# Patient Record
Sex: FEMALE | Race: OTHER | Employment: FULL TIME | URBAN - METROPOLITAN AREA
[De-identification: names, ages, dates, MRNs, and addresses within clinical notes are randomized per-mention and may not be internally consistent; named-entity substitution may affect disease eponyms.]

---

## 2022-06-28 ENCOUNTER — APPOINTMENT (OUTPATIENT)
Dept: GENERAL RADIOLOGY | Age: 57
End: 2022-06-28
Payer: COMMERCIAL

## 2022-06-28 ENCOUNTER — HOSPITAL ENCOUNTER (EMERGENCY)
Age: 57
Discharge: HOME | End: 2022-06-28
Payer: COMMERCIAL

## 2022-06-28 VITALS
BODY MASS INDEX: 26.53 KG/M2 | DIASTOLIC BLOOD PRESSURE: 49 MMHG | HEIGHT: 61 IN | HEART RATE: 71 BPM | TEMPERATURE: 98.3 F | OXYGEN SATURATION: 100 % | WEIGHT: 140.5 LBS | SYSTOLIC BLOOD PRESSURE: 109 MMHG | RESPIRATION RATE: 15 BRPM

## 2022-06-28 DIAGNOSIS — R07.89 CHEST WALL PAIN: Primary | ICD-10-CM

## 2022-06-28 LAB
ALBUMIN SERPL-MCNC: 4.1 G/DL (ref 3.5–4.6)
ALP BLD-CCNC: 68 U/L (ref 40–130)
ALT SERPL-CCNC: 32 U/L (ref 0–33)
ANION GAP SERPL CALCULATED.3IONS-SCNC: 9 MEQ/L (ref 9–15)
AST SERPL-CCNC: 21 U/L (ref 0–35)
BASOPHILS ABSOLUTE: 0 K/UL (ref 0–0.2)
BASOPHILS RELATIVE PERCENT: 0.4 %
BILIRUB SERPL-MCNC: 0.4 MG/DL (ref 0.2–0.7)
BUN BLDV-MCNC: 19 MG/DL (ref 6–20)
CALCIUM SERPL-MCNC: 8.9 MG/DL (ref 8.5–9.9)
CHLORIDE BLD-SCNC: 100 MEQ/L (ref 95–107)
CO2: 29 MEQ/L (ref 20–31)
CREAT SERPL-MCNC: 0.56 MG/DL (ref 0.5–0.9)
D DIMER: 0.3 MG/L FEU (ref 0–0.5)
EOSINOPHILS ABSOLUTE: 0.1 K/UL (ref 0–0.7)
EOSINOPHILS RELATIVE PERCENT: 0.7 %
GFR AFRICAN AMERICAN: >60
GFR NON-AFRICAN AMERICAN: >60
GLOBULIN: 2.9 G/DL (ref 2.3–3.5)
GLUCOSE BLD-MCNC: 88 MG/DL (ref 70–99)
HCT VFR BLD CALC: 40.1 % (ref 37–47)
HEMOGLOBIN: 13.5 G/DL (ref 12–16)
LYMPHOCYTES ABSOLUTE: 2.8 K/UL (ref 1–4.8)
LYMPHOCYTES RELATIVE PERCENT: 27.2 %
MAGNESIUM: 2.4 MG/DL (ref 1.7–2.4)
MCH RBC QN AUTO: 28.1 PG (ref 27–31.3)
MCHC RBC AUTO-ENTMCNC: 33.5 % (ref 33–37)
MCV RBC AUTO: 83.8 FL (ref 82–100)
MONOCYTES ABSOLUTE: 0.9 K/UL (ref 0.2–0.8)
MONOCYTES RELATIVE PERCENT: 9.2 %
NEUTROPHILS ABSOLUTE: 6.4 K/UL (ref 1.4–6.5)
NEUTROPHILS RELATIVE PERCENT: 62.5 %
PDW BLD-RTO: 13.5 % (ref 11.5–14.5)
PLATELET # BLD: 307 K/UL (ref 130–400)
POTASSIUM SERPL-SCNC: 4 MEQ/L (ref 3.4–4.9)
PRO-BNP: 103 PG/ML
RBC # BLD: 4.79 M/UL (ref 4.2–5.4)
SODIUM BLD-SCNC: 138 MEQ/L (ref 135–144)
TOTAL PROTEIN: 7 G/DL (ref 6.3–8)
TROPONIN: <0.01 NG/ML (ref 0–0.01)
TROPONIN: <0.01 NG/ML (ref 0–0.01)
WBC # BLD: 10.2 K/UL (ref 4.8–10.8)

## 2022-06-28 PROCEDURE — 85379 FIBRIN DEGRADATION QUANT: CPT

## 2022-06-28 PROCEDURE — 71045 X-RAY EXAM CHEST 1 VIEW: CPT

## 2022-06-28 PROCEDURE — 6370000000 HC RX 637 (ALT 250 FOR IP)

## 2022-06-28 PROCEDURE — 36415 COLL VENOUS BLD VENIPUNCTURE: CPT

## 2022-06-28 PROCEDURE — 80053 COMPREHEN METABOLIC PANEL: CPT

## 2022-06-28 PROCEDURE — 85025 COMPLETE CBC W/AUTO DIFF WBC: CPT

## 2022-06-28 PROCEDURE — 84484 ASSAY OF TROPONIN QUANT: CPT

## 2022-06-28 PROCEDURE — 99285 EMERGENCY DEPT VISIT HI MDM: CPT

## 2022-06-28 PROCEDURE — 83880 ASSAY OF NATRIURETIC PEPTIDE: CPT

## 2022-06-28 PROCEDURE — 83735 ASSAY OF MAGNESIUM: CPT

## 2022-06-28 RX ORDER — ASPIRIN 81 MG/1
324 TABLET, CHEWABLE ORAL ONCE
Status: COMPLETED | OUTPATIENT
Start: 2022-06-28 | End: 2022-06-28

## 2022-06-28 RX ORDER — NITROGLYCERIN 0.4 MG/1
0.4 TABLET SUBLINGUAL EVERY 5 MIN PRN
Status: DISCONTINUED | OUTPATIENT
Start: 2022-06-28 | End: 2022-06-28 | Stop reason: HOSPADM

## 2022-06-28 RX ADMIN — NITROGLYCERIN 0.4 MG: 0.4 TABLET SUBLINGUAL at 14:01

## 2022-06-28 RX ADMIN — ASPIRIN 324 MG: 81 TABLET, CHEWABLE ORAL at 14:02

## 2022-06-28 ASSESSMENT — ENCOUNTER SYMPTOMS
DIARRHEA: 0
NAUSEA: 0
SHORTNESS OF BREATH: 0
ABDOMINAL PAIN: 0
PHOTOPHOBIA: 0
VOMITING: 0
CHOKING: 0
COUGH: 0
WHEEZING: 0

## 2022-06-28 ASSESSMENT — PAIN - FUNCTIONAL ASSESSMENT
PAIN_FUNCTIONAL_ASSESSMENT: NONE - DENIES PAIN
PAIN_FUNCTIONAL_ASSESSMENT: NONE - DENIES PAIN

## 2022-06-28 ASSESSMENT — PAIN DESCRIPTION - LOCATION: LOCATION: CHEST;ARM

## 2022-06-28 ASSESSMENT — PAIN DESCRIPTION - DESCRIPTORS: DESCRIPTORS: ACHING

## 2022-06-28 ASSESSMENT — PAIN DESCRIPTION - ORIENTATION: ORIENTATION: LEFT

## 2022-06-28 NOTE — ED TRIAGE NOTES
PT C/O MID CHEST PAIN THAT RADIATES TOWARDS LEFT ARM X 1 HOUR TODAY, PT STATES SHE WAS WAKING AROUND WHEN PAIN STARTED, DENIES SOB, 0 N&V. PT STATES SHE HAD COVID INFECTION 2 WEEKS AGO, DENIES COUGH. PT STATES IT HURTS WHEN PRESSING DOWN TO CHEST. PT DENIES TAKING ANY PIN MEDS TODAY, PT TAKING ATENOLOL 25 MG DAILY. NOT TAKEN TODAY. EKG DONE IN TRIAGE NSR.

## 2022-06-28 NOTE — ED NOTES
Provider made aware that the patient reported relief of pain prior to administration of nitro   Per provider, pt does not need to given additional doses at this time     Cornell Goodson RN  06/28/22 0475

## 2022-06-28 NOTE — ED PROVIDER NOTES
[address] [facility] ED  eMERGENCY dEPARTMENT eNCOUnter      Pt Name: Nelda Nunn  MRN: [id number]  [place] 1965  Date of evaluation: 6/28/2022  Provider: CLARISA Stewart        HISTORY OF PRESENT ILLNESS    Nelda Nunn is a 49 y.o. female per chart review has ah/o palpitations, HLD. Presents emergency department for 1 hour history of left-sided chest pain that radiates into her left arm. States it started [place] the [place] just prior to arrival.  No associated shortness of breath, diaphoresis, nausea, lightheadedness. No history of similar. No CAD history per patient. Patient is traveling from out of town from [place] is not local.  She does see a cardiologist back home for history of palpitations, states she completed a Holter monitor recently that reportedly did not show anything but she is now taking atenolol for tachycardia/palpitations. No history of CAD in patient does not follow with cardiology for any reason other than palpitations. Does report family history of CAD. She also recently got over COVID, had the infection about 2 weeks ago, states she recovered well, denying cough, shortness of breath. Does state her chest is tender to palpation. Denies any history of blood clots. Came right here nothing taken PTA and the pain is still present. Does have history of costochondritis states her chest tenderness is similar to that but the overall pain presentation is different. REVIEW OF SYSTEMS       Review of Systems   Constitutional: Negative for chills, diaphoresis and fever. HENT: Negative for congestion. Eyes: Negative for photophobia. Respiratory: Negative for cough, choking, shortness of breath and wheezing. Cardiovascular: Positive for chest pain and palpitations (chronic intermittent history). Negative for leg swelling. Gastrointestinal: Negative for abdominal pain, diarrhea, nausea and vomiting.    Genitourinary: Negative for difficulty urinating. Musculoskeletal: Negative for myalgias. Neurological: Negative for headaches. Psychiatric/Behavioral: Negative for confusion. Except as noted above the remainder of the review of systems was reviewed and negative. PAST MEDICAL HISTORY     Past Medical History:   Diagnosis Date    IBS (irritable bowel syndrome)     Palpitations          SURGICAL HISTORY       Past Surgical History:   Procedure Laterality Date     SECTION           CURRENT MEDICATIONS       There are no discharge medications for this patient. ALLERGIES     Patient has no known allergies. FAMILY HISTORY     History reviewed. No pertinent family history. SOCIAL HISTORY       Social History     Socioeconomic History    Marital status:      Spouse name: None    Number of children: None    Years of education: None    Highest education level: None   Occupational History    None   Tobacco Use    Smoking status: Never Smoker    Smokeless tobacco: Never Used   Substance and Sexual Activity    Alcohol use: Not Currently    Drug use: Never    Sexual activity: None   Other Topics Concern    None   Social History Narrative    None     Social Determinants of Health     Financial Resource Strain:     Difficulty of Paying Living Expenses: Not on file   Food Insecurity:     Worried About Running Out of Food in the Last Year: Not on file    [place] of Food in the Last Year: Not on file   Transportation Needs:     Lack of Transportation (Medical): Not on file    Lack of Transportation (Non-Medical):  Not on file   Physical Activity:     Days of Exercise per Week: Not on file    Minutes of Exercise per Session: Not on file   Stress:     Feeling of Stress : Not on file   Social Connections:     Frequency of Communication with Friends and Family: Not on file    Frequency of Social Gatherings with Friends and Family: Not on file    Attends Sabianism Services: Not on file   [employer] of Clubs or Organizations: Not on file    Attends Club or Organization Meetings: Not on file    Marital Status: Not on file   Intimate Partner Violence:     Fear of Current or Ex-Partner: Not on file    Emotionally Abused: Not on file    Physically Abused: Not on file    Sexually Abused: Not on file   Housing Stability:     Unable to Pay for Housing in the Last Year: Not on file    Number of [place] in the Last Year: Not on file    Unstable Housing in the Last Year: Not on file         PHYSICAL EXAM        ED Triage Vitals [06/28/22 1245]   BP Temp Temp Source Heart Rate Resp SpO2 Height Weight   133/67 98.3 °F (36.8 °C) Oral 89 18 100 % 5' 1\" (1.549 m) 140 lb 8 oz (63.7 kg)       Physical Exam  Constitutional:       General: She is not in acute distress. Appearance: Normal appearance. She is not ill-appearing, toxic-appearing or diaphoretic. HENT:      Head: Normocephalic and atraumatic. Right Ear: External ear normal.      Left Ear: External ear normal.      Nose: Nose normal.      Mouth/Throat:      Mouth: Mucous membranes are moist.      Pharynx: Oropharynx is clear. Eyes:      Extraocular Movements: Extraocular movements intact. Cardiovascular:      Rate and Rhythm: Normal rate and regular rhythm. Pulmonary:      Effort: Pulmonary effort is normal. No tachypnea, accessory muscle usage or respiratory distress. Breath sounds: Examination of the left-middle field reveals wheezing. Examination of the left-lower field reveals wheezing. Wheezing present. Chest:      Chest wall: Tenderness present. No swelling or crepitus. Abdominal:      General: Bowel sounds are normal.      Palpations: Abdomen is soft. Tenderness: There is no abdominal tenderness. Musculoskeletal:         General: Normal range of motion. Cervical back: Normal range of motion. Skin:     General: Skin is warm.    Neurological:      Mental Status: She is alert and oriented to person, place, and time.   Psychiatric:         Mood and Affect: Mood normal.         Behavior: Behavior normal.           LABS:  Labs Reviewed   CBC WITH AUTO DIFFERENTIAL - Abnormal; Notable for the following components:       Result Value    Monocytes Absolute 0.9 (*)     All other components within normal limits   COMPREHENSIVE METABOLIC PANEL   MAGNESIUM   TROPONIN   BRAIN NATRIURETIC PEPTIDE   D-DIMER, QUANTITATIVE   TROPONIN     EKG NSR HR 73 T wave inversions lead V1 V2 no ST depressions or elevations regular intervals no prior for comparison    MDM:   Vitals:    Vitals:    06/28/22 1438 06/28/22 1458 06/28/22 1603 06/28/22 1730   BP:  122/66 (!) 110/46 (!) 109/49   Pulse: 78 74 71 71   Resp: 15 15 16 15   Temp:       TempSrc:       SpO2:   100% 100%   Weight:       Height:           49year old female patient to ED for left-sided chest pain occurred just PTA. She presents reporting her chest is also tender to palpation. Patient without history of CAD. Does have history of palpitations takes atenolol. Afebrile VSS. Patient is ordered nitro but states her pain is relieved just prior to administration. On exam her chest is tender to palpation. No diaphoresis or distress. No clinical evidence of fluid overload. EKG with anterior T wave inversions. No prior for comparison. No ST elevations or depressions. Troponin is negative. Chest x-ray normal.  I discussed with patient that her symptom presentation is concerning, discussed there are potential ischemic changes on her EKG not sure if new due to no comparison, but discussed with patient I would like to admit her for her presentation. Patient is from [place] and states she is leaving to go home tomorrow regardless. I discussed with patient if she was local she would definitely be admitted for her presentation today. Shared decision making is utilized with patient and  who really do not want to stay.   We come to the understanding that a delta troponin will be drawn and if negative patient will be able to go home. 3-hour repeat troponin is negative. Patient has remained pain-free throughout the rest of her stay, self-limiting, she is present in the ED for 5 and half hours. Patient understands that she will contact her cardiologist immediately tomorrow for close follow-up and she has very strict return precautions that if her symptoms return in any way she must get to the emergency department. Patient and  agree to this plan and are understanding of present cardiac risks but stable for discharge. CRITICAL CARE TIME   Total CriticalCare time was 0 minutes, excluding separately reportable procedures. There was a high probability of clinically significant/life threatening deterioration in the patient's condition which required my urgent intervention. PROCEDURES:  Unlessotherwise noted below, none      Procedures      FINAL IMPRESSION      1.  Chest wall pain          DISPOSITION/PLAN   DISPOSITION Decision To Discharge 06/28/2022 05:58:12 PM          CLARISA Davidson (electronically signed)  Attending Emergency Physician          Augustus Davalos, [id number]  06/29/22 8532

## 2022-07-03 LAB
EKG ATRIAL RATE: 73 BPM
EKG P AXIS: 62 DEGREES
EKG P-R INTERVAL: 136 MS
EKG Q-T INTERVAL: 376 MS
EKG QRS DURATION: 70 MS
EKG QTC CALCULATION (BAZETT): 414 MS
EKG R AXIS: 17 DEGREES
EKG T AXIS: 49 DEGREES
EKG VENTRICULAR RATE: 73 BPM